# Patient Record
Sex: MALE | Race: OTHER | HISPANIC OR LATINO | Employment: FULL TIME | URBAN - METROPOLITAN AREA
[De-identification: names, ages, dates, MRNs, and addresses within clinical notes are randomized per-mention and may not be internally consistent; named-entity substitution may affect disease eponyms.]

---

## 2023-04-22 ENCOUNTER — HOSPITAL ENCOUNTER (EMERGENCY)
Facility: MEDICAL CENTER | Age: 18
End: 2023-04-23
Attending: STUDENT IN AN ORGANIZED HEALTH CARE EDUCATION/TRAINING PROGRAM
Payer: MEDICAID

## 2023-04-22 ENCOUNTER — APPOINTMENT (OUTPATIENT)
Dept: RADIOLOGY | Facility: MEDICAL CENTER | Age: 18
End: 2023-04-22
Attending: STUDENT IN AN ORGANIZED HEALTH CARE EDUCATION/TRAINING PROGRAM
Payer: MEDICAID

## 2023-04-22 DIAGNOSIS — M79.641 HAND PAIN, RIGHT: ICD-10-CM

## 2023-04-22 DIAGNOSIS — R05.2 SUBACUTE COUGH: ICD-10-CM

## 2023-04-22 PROCEDURE — 99283 EMERGENCY DEPT VISIT LOW MDM: CPT

## 2023-04-22 PROCEDURE — 73130 X-RAY EXAM OF HAND: CPT | Mod: RT

## 2023-04-22 RX ORDER — KETOROLAC TROMETHAMINE 10 MG/1
10 TABLET, FILM COATED ORAL EVERY 6 HOURS PRN
Qty: 20 TABLET | Refills: 0 | Status: SHIPPED | OUTPATIENT
Start: 2023-04-22

## 2023-04-22 RX ORDER — IBUPROFEN 600 MG/1
600 TABLET ORAL ONCE
Status: COMPLETED | OUTPATIENT
Start: 2023-04-23 | End: 2023-04-23

## 2023-04-22 ASSESSMENT — PAIN DESCRIPTION - DESCRIPTORS: DESCRIPTORS: ACHING

## 2023-04-23 VITALS
HEIGHT: 66 IN | TEMPERATURE: 97 F | OXYGEN SATURATION: 98 % | BODY MASS INDEX: 40.85 KG/M2 | DIASTOLIC BLOOD PRESSURE: 88 MMHG | HEART RATE: 88 BPM | WEIGHT: 254.19 LBS | SYSTOLIC BLOOD PRESSURE: 138 MMHG | RESPIRATION RATE: 16 BRPM

## 2023-04-23 PROCEDURE — A9270 NON-COVERED ITEM OR SERVICE: HCPCS | Performed by: STUDENT IN AN ORGANIZED HEALTH CARE EDUCATION/TRAINING PROGRAM

## 2023-04-23 PROCEDURE — 700102 HCHG RX REV CODE 250 W/ 637 OVERRIDE(OP): Performed by: STUDENT IN AN ORGANIZED HEALTH CARE EDUCATION/TRAINING PROGRAM

## 2023-04-23 RX ADMIN — IBUPROFEN 600 MG: 600 TABLET, FILM COATED ORAL at 00:01

## 2023-04-23 NOTE — ED TRIAGE NOTES
Damir Lopez  17 y.o.  male  Chief Complaint   Patient presents with    Hand Pain     C/o right hand pain s/p punched a closet door. + CMS

## 2023-04-23 NOTE — ED PROVIDER NOTES
"ED Provider Note    CHIEF COMPLAINT  Chief Complaint   Patient presents with    Hand Pain     C/o right hand pain s/p punched a closet door. + CMS       HPI/ROS  LIMITATION TO HISTORY   Select: : None      Damir Lopez is a 17 y.o. male who presents with right hand pain after punching a closet door.  He reports tingling over his fifth knuckle but denies any other numbness.  He is right-handed.  Denies any other injuries.  He also reports a cough that has been going on for 2 to 3 weeks.  He reports he had cough and fever several weeks ago, a close contact had COVID, and he states he just finished quarantine.    PAST MEDICAL HISTORY  No past medical history on file.     SURGICAL HISTORY  History reviewed. No pertinent surgical history.     FAMILY HISTORY  History reviewed. No pertinent family history.    SOCIAL HISTORY       CURRENT MEDICATIONS  Home Medications    Medication Sig Taking? Last Dose Authorizing Provider   ketorolac (TORADOL) 10 MG Tab Take 1 Tablet by mouth every 6 hours as needed for Severe Pain or Moderate Pain. Yes  Nishi Duggan M.D.       ALLERGIES  No Known Allergies    PHYSICAL EXAM  BP (!) 142/98   Pulse 98   Temp 36.1 °C (97 °F) (Temporal)   Resp 16   Ht 1.676 m (5' 6\")   Wt 115 kg (254 lb 3.1 oz)   SpO2 96%   Constitutional: Alert in no apparent distress.  HENT: No signs of trauma, Bilateral external ears normal, Nose normal.   Eyes: Pupils are equal and reactive, Conjunctiva normal, Non-icteric.   Neck: Normal range of motion, No tenderness, Supple, No stridor.   Cardiovascular: Regular rate and rhythm, no murmurs.   Thorax & Lungs: Normal breath sounds, No respiratory distress, No wheezing  Abdomen: Soft, No tenderness, No peritoneal signs, No masses, No pulsatile masses.   Skin: Warm, Dry, No erythema, No rash.   Extremities: Intact distal pulses, No edema, No tenderness, No cyanosis  Musculoskeletal: Mild tenderness over fifth knuckle on right hand, no open wounds, no " snuffbox tenderness, 2+ radial pulse, intact radian, median, ulnar, and sensation and function  Neurologic: Alert , Normal motor function, Normal speech, No focal deficits noted.   Psychiatric: Affect normal, Judgment normal, Mood normal.         DIAGNOSTIC STUDIES / PROCEDURES    RADIOLOGY  I have independently interpreted the diagnostic imaging associated with this visit and am waiting the final reading from the radiologist.   My preliminary interpretation is a follows: X-ray of right hand shows no acute fractures or dislocations  Radiologist interpretation:   DX-HAND 3+ RIGHT   Final Result      No evidence of fracture or dislocation.          COURSE & MEDICAL DECISION MAKING    ED Observation Status? No; Patient does not meet criteria for ED Observation.     INITIAL ASSESSMENT, COURSE AND PLAN  Care Narrative: 17 y.o. male presented with  right hand pain after punching a door. Normal pulses present distal to the injury and there were no open wounds to suggest open fracture or joint. Compartments were soft, no concern for compartment syndrome. Nerve function both motor and sensory was intact. X-ray showed no evidence of fracture or dislocation. Pain was treated with ibuprofen. No other injuries were present on exam.  Separately patient also complains of a cough that has been going on for 2 to 3 weeks.  On exam his lung sounds are clear.  He reports symptoms consistent with a likely viral illness 2 to 3 weeks ago.  I do not suspect pneumonia given his clear breath sounds, no recent fevers.  His oxygenation is normal.  Most likely has a postviral cough that is persisting.  Discharged home with return precautions and instructed to follow up for repeat exam and possibly imaging should pain persist or worsen.        ADDITIONAL PROBLEM LIST    Acute right hand pain  Cough    DISPOSITION AND DISCUSSIONS    Decision tools and prescription drugs considered including, but not limited to: Pain Medications Toradol  .    Discharged home in stable condition    FINAL DIAGNOSIS  1. Hand pain, right Acute ketorolac (TORADOL) 10 MG Tab      2. Subacute cough              Electronically signed by: Nishi Duggan M.D., 04/22/23 11:43 PM

## 2023-04-23 NOTE — DISCHARGE INSTRUCTIONS
Take the following medications for pain/fever at home:  Acetaminophen (Tylenol): Take 650 mg (2 regular strength) every 6 hours. Do not take more than 3,000mg in a 24 hour period.   Ibuprofen: Take 400-600 mg (2-3 regular strength) every 6 hours. Take with food.   Alternate the two medications and you can take one of them every 3 hours.     You may also take the Toradol we prescribed in place of the ibuprofen if desired.  If you are still having substantial pain in your hand in a week he should follow-up for repeat x-rays to rule out occult fracture.